# Patient Record
Sex: MALE
[De-identification: names, ages, dates, MRNs, and addresses within clinical notes are randomized per-mention and may not be internally consistent; named-entity substitution may affect disease eponyms.]

---

## 2022-10-18 ENCOUNTER — NURSE TRIAGE (OUTPATIENT)
Dept: OTHER | Facility: CLINIC | Age: 22
End: 2022-10-18

## 2022-10-18 NOTE — TELEPHONE ENCOUNTER
Location of patient: [place]    Subjective: Caller states \"symptoms of viagra\"     Current Symptoms: extended erection, took viagra an hour and a half ago, feels like his \"nuts are full\"    Onset: 1 hour ago; sudden    Associated Symptoms: NA    Pain Severity: 0/10; N/A; none    Temperature: denies fever     What has been tried: \"jacking off\"    LMP: NA Pregnant: NA    Recommended disposition:  Call [facility]    Care advice provided, patient verbalizes understanding; denies any other questions or concerns; instructed to call back for any new or worsening symptoms. Writer gave patient number to Poison Control    This triage is a result of a call to [address] [place] [address]. Please do not respond to the triage nurse through this encounter. Any subsequent communication should be directly with the patient.     Reason for Disposition   Took another person's prescription drug    Protocols used: Medication Question Call-ADULT-[place]